# Patient Record
Sex: FEMALE | Race: WHITE | NOT HISPANIC OR LATINO | URBAN - METROPOLITAN AREA
[De-identification: names, ages, dates, MRNs, and addresses within clinical notes are randomized per-mention and may not be internally consistent; named-entity substitution may affect disease eponyms.]

---

## 2021-05-15 ENCOUNTER — OUT OF OFFICE VISIT (OUTPATIENT)
Dept: URBAN - METROPOLITAN AREA MEDICAL CENTER 24 | Facility: MEDICAL CENTER | Age: 36
End: 2021-05-15
Payer: MEDICARE

## 2021-05-15 DIAGNOSIS — R10.84 ABDOMINAL CRAMPING, GENERALIZED: ICD-10-CM

## 2021-05-15 DIAGNOSIS — K21.9 ACID REFLUX: ICD-10-CM

## 2021-05-15 DIAGNOSIS — K58.2 IRRITABLE BOWEL SYNDROME WITH BOTH CONSTIPATION AND DIARRHEA: ICD-10-CM

## 2021-05-15 DIAGNOSIS — R74.8 ABNORMAL ALKALINE PHOSPHATASE TEST: ICD-10-CM

## 2021-05-15 PROCEDURE — G8427 DOCREV CUR MEDS BY ELIG CLIN: HCPCS | Performed by: INTERNAL MEDICINE

## 2021-05-15 PROCEDURE — 99222 1ST HOSP IP/OBS MODERATE 55: CPT | Performed by: INTERNAL MEDICINE

## 2021-05-16 ENCOUNTER — OUT OF OFFICE VISIT (OUTPATIENT)
Dept: URBAN - METROPOLITAN AREA MEDICAL CENTER 24 | Facility: MEDICAL CENTER | Age: 36
End: 2021-05-16
Payer: MEDICARE

## 2021-05-16 DIAGNOSIS — K21.9 ACID REFLUX: ICD-10-CM

## 2021-05-16 DIAGNOSIS — K31.89 ACQUIRED DEFORMITY OF DUODENUM: ICD-10-CM

## 2021-05-16 PROCEDURE — 43239 EGD BIOPSY SINGLE/MULTIPLE: CPT | Performed by: INTERNAL MEDICINE

## 2021-05-17 ENCOUNTER — OFFICE VISIT (OUTPATIENT)
Dept: URBAN - METROPOLITAN AREA CLINIC 92 | Facility: CLINIC | Age: 36
End: 2021-05-17

## 2024-02-13 ENCOUNTER — LAB (OUTPATIENT)
Dept: URBAN - METROPOLITAN AREA CLINIC 115 | Facility: CLINIC | Age: 39
End: 2024-02-13

## 2024-02-13 ENCOUNTER — OV CON (OUTPATIENT)
Dept: URBAN - METROPOLITAN AREA CLINIC 115 | Facility: CLINIC | Age: 39
End: 2024-02-13
Payer: MEDICARE

## 2024-02-13 VITALS
TEMPERATURE: 97.7 F | BODY MASS INDEX: 35.03 KG/M2 | HEIGHT: 66 IN | SYSTOLIC BLOOD PRESSURE: 131 MMHG | DIASTOLIC BLOOD PRESSURE: 90 MMHG | HEART RATE: 103 BPM | WEIGHT: 218 LBS

## 2024-02-13 DIAGNOSIS — F41.9 ANXIETY DISORDER, UNSPECIFIED: ICD-10-CM

## 2024-02-13 DIAGNOSIS — K76.0 NAFLD (NONALCOHOLIC FATTY LIVER DISEASE): ICD-10-CM

## 2024-02-13 DIAGNOSIS — D12.6 COLON ADENOMAS: ICD-10-CM

## 2024-02-13 DIAGNOSIS — R10.12 LUQ PAIN: ICD-10-CM

## 2024-02-13 DIAGNOSIS — Z87.19 HISTORY OF IBS: ICD-10-CM

## 2024-02-13 DIAGNOSIS — R16.2 HEPATOSPLENOMEGALY: ICD-10-CM

## 2024-02-13 DIAGNOSIS — E66.9 OBESITY (BMI 30-39.9): ICD-10-CM

## 2024-02-13 DIAGNOSIS — R10.9 FUNCTIONAL ABDOMINAL PAIN SYNDROME: ICD-10-CM

## 2024-02-13 DIAGNOSIS — F32.A DEPRESSION, UNSPECIFIED: ICD-10-CM

## 2024-02-13 PROBLEM — 231504006: Status: ACTIVE | Noted: 2024-02-13

## 2024-02-13 PROBLEM — 162864005: Status: ACTIVE | Noted: 2024-02-13

## 2024-02-13 PROBLEM — 1231824009: Status: ACTIVE | Noted: 2024-02-13

## 2024-02-13 PROCEDURE — 99204 OFFICE O/P NEW MOD 45 MIN: CPT | Performed by: INTERNAL MEDICINE

## 2024-02-13 RX ORDER — OMEPRAZOLE MAGNESIUM 10 MG/1
1 PACKET MIXED WITH 15 ML OF WATER GRANULE, DELAYED RELEASE ORAL ONCE A DAY
Qty: 30 | Status: ACTIVE | COMMUNITY
Start: 2024-02-13

## 2024-02-13 RX ORDER — DICYCLOMINE HYDROCHLORIDE 20 MG/1
1 TABLET TABLET ORAL
Qty: 60 | Refills: 1 | OUTPATIENT
Start: 2024-02-13 | End: 2024-04-13

## 2024-02-13 RX ORDER — NADOLOL 40 MG/1
TABLET ORAL
Qty: 90 TABLET | Status: ACTIVE | COMMUNITY

## 2024-02-13 RX ORDER — IMMUNE GLOBULIN SUBCUTANEOUS (HUMAN) 200 MG/ML
INJECTION, SOLUTION SUBCUTANEOUS
Qty: 160 | Status: ACTIVE | COMMUNITY

## 2024-02-13 RX ORDER — GABAPENTIN 300 MG/1
CAPSULE ORAL
Qty: 180 CAPSULE | Status: ACTIVE | COMMUNITY

## 2024-02-13 NOTE — HPI-TODAY'S VISIT:
EGD '21 w gastritis, no hp, no celiac. Colonoscopy '16 w nml random bx. hx IBSd. Fibromyalgia, chr pain, disability, anxiety/DPN, sees psych, on seroquel, on neurontin recently increased. MRI NS '21 w nml GB, Hepatosplenomegaly. reports prior hx pancreatitis. Colonoscopy '19 [place], 2 small adenomas, nml TI, nml random bx, rec 5 y repeat. Int LUQ pains, can last hours, can be severe. hx nml GES.

## 2024-02-18 LAB
ACTIN (SMOOTH MUSCLE) ANTIBODY (IGG): <20
ALBUMIN/GLOBULIN RATIO: 1.6
ALBUMIN: 4.6
ALKALINE PHOSPHATASE: 80
ALT: 8
AST: 17
BILIRUBIN, TOTAL: 0.4
BUN/CREATININE RATIO: (no result)
CALCIUM: 9.7
CARBON DIOXIDE: 18
CERULOPLASMIN: 36
CHLORIDE: 107
CREATININE: 0.97
EGFR: 77
FERRITIN, SERUM: 37
FIB 4 INDEX: 0.74
FIB 4 INTERPRETATION: (no result)
GLOBULIN: 2.8
GLUCOSE: 89
HEMATOCRIT: 45.4
HEMOGLOBIN: 15.3
HEPATITIS B CORE AB TOTAL: (no result)
HEPATITIS B SURFACE AB IMMUNITY, QN: 308
HEPATITIS B SURFACE ANTIGEN: (no result)
HEPATITIS C ANTIBODY: (no result)
IMMUNOGLOBULIN A, QN, SERUM: 115
INTERPRETATION: (no result)
IRON BIND.CAP.(TIBC): 360
IRON SATURATION: 20
IRON: 72
LIPASE: 77
MCH: 28.3
MCHC: 33.7
MCV: 84.1
MITOCHONDRIAL (M2) ANTIBODY: <=20
MPV: 11.9
PLATELET COUNT: 310
PLATELET COUNT: 310
POTASSIUM: 4.3
PROTEIN, TOTAL: 7.4
RDW: 12.7
RED BLOOD CELL COUNT: 5.4
SODIUM: 140
T-TRANSGLUTAMINASE (TTG) IGA: <1
UREA NITROGEN (BUN): 17
WHITE BLOOD CELL COUNT: 7.8

## 2024-03-15 ENCOUNTER — COL/EGD (OUTPATIENT)
Dept: URBAN - METROPOLITAN AREA SURGERY CENTER 13 | Facility: SURGERY CENTER | Age: 39
End: 2024-03-15
Payer: MEDICARE

## 2024-03-15 ENCOUNTER — LAB (OUTPATIENT)
Dept: URBAN - METROPOLITAN AREA CLINIC 4 | Facility: CLINIC | Age: 39
End: 2024-03-15
Payer: MEDICARE

## 2024-03-15 ENCOUNTER — LAB (OUTPATIENT)
Dept: URBAN - METROPOLITAN AREA SURGERY CENTER 13 | Facility: SURGERY CENTER | Age: 39
End: 2024-03-15

## 2024-03-15 DIAGNOSIS — K31.89 OTHER DISEASES OF STOMACH AND DUODENUM: ICD-10-CM

## 2024-03-15 DIAGNOSIS — T47.8X5A ADVERSE EFFECT OF OTHER AGENTS PRIMARILY AFFECTING GASTROINTESTINAL SYSTEM, INITIAL ENCOUNTER: ICD-10-CM

## 2024-03-15 DIAGNOSIS — K31.89 ACHYLIA: ICD-10-CM

## 2024-03-15 DIAGNOSIS — K64.1 BLEEDING GRADE II HEMORRHOIDS: ICD-10-CM

## 2024-03-15 DIAGNOSIS — R19.7 ACUTE DIARRHEA: ICD-10-CM

## 2024-03-15 DIAGNOSIS — K63.89 OTHER SPECIFIED DISEASES OF INTESTINE: ICD-10-CM

## 2024-03-15 DIAGNOSIS — K63.89 BACTERIAL OVERGROWTH SYNDROME: ICD-10-CM

## 2024-03-15 DIAGNOSIS — R10.33 ABDOMINAL PAIN, ACUTE, PERIUMBILICAL: ICD-10-CM

## 2024-03-15 PROCEDURE — 88313 SPECIAL STAINS GROUP 2: CPT | Performed by: PATHOLOGY

## 2024-03-15 PROCEDURE — 88342 IMHCHEM/IMCYTCHM 1ST ANTB: CPT | Performed by: PATHOLOGY

## 2024-03-15 PROCEDURE — 88312 SPECIAL STAINS GROUP 1: CPT | Performed by: PATHOLOGY

## 2024-03-15 PROCEDURE — 46221 LIGATION OF HEMORRHOID(S): CPT | Performed by: INTERNAL MEDICINE

## 2024-03-15 PROCEDURE — 43239 EGD BIOPSY SINGLE/MULTIPLE: CPT | Performed by: INTERNAL MEDICINE

## 2024-03-15 PROCEDURE — 88305 TISSUE EXAM BY PATHOLOGIST: CPT | Performed by: PATHOLOGY

## 2024-03-15 PROCEDURE — 45380 COLONOSCOPY AND BIOPSY: CPT | Performed by: INTERNAL MEDICINE

## 2024-03-15 RX ORDER — DICYCLOMINE HYDROCHLORIDE 20 MG/1
1 TABLET TABLET ORAL
Qty: 60 | Refills: 1 | Status: ACTIVE | COMMUNITY
Start: 2024-02-13 | End: 2024-04-13

## 2024-03-15 RX ORDER — IMMUNE GLOBULIN SUBCUTANEOUS (HUMAN) 200 MG/ML
INJECTION, SOLUTION SUBCUTANEOUS
Qty: 160 | Status: ACTIVE | COMMUNITY

## 2024-03-15 RX ORDER — OMEPRAZOLE MAGNESIUM 10 MG/1
1 PACKET MIXED WITH 15 ML OF WATER GRANULE, DELAYED RELEASE ORAL ONCE A DAY
Qty: 30 | Status: ACTIVE | COMMUNITY
Start: 2024-02-13

## 2024-03-15 RX ORDER — GABAPENTIN 300 MG/1
CAPSULE ORAL
Qty: 180 CAPSULE | Status: ACTIVE | COMMUNITY

## 2024-03-15 RX ORDER — NADOLOL 40 MG/1
TABLET ORAL
Qty: 90 TABLET | Status: ACTIVE | COMMUNITY

## 2024-04-15 ENCOUNTER — LAB (OUTPATIENT)
Dept: URBAN - METROPOLITAN AREA CLINIC 115 | Facility: CLINIC | Age: 39
End: 2024-04-15

## 2024-05-01 ENCOUNTER — LAB OUTSIDE AN ENCOUNTER (OUTPATIENT)
Dept: URBAN - METROPOLITAN AREA CLINIC 115 | Facility: CLINIC | Age: 39
End: 2024-05-01

## 2024-05-01 ENCOUNTER — TELEPHONE ENCOUNTER (OUTPATIENT)
Dept: URBAN - METROPOLITAN AREA CLINIC 115 | Facility: CLINIC | Age: 39
End: 2024-05-01

## 2024-05-02 ENCOUNTER — LAB OUTSIDE AN ENCOUNTER (OUTPATIENT)
Dept: URBAN - METROPOLITAN AREA CLINIC 115 | Facility: CLINIC | Age: 39
End: 2024-05-02

## 2024-05-02 LAB
CREATININE POC: 1.1
PERFORMING LAB: (no result)

## 2024-05-03 ENCOUNTER — TELEPHONE ENCOUNTER (OUTPATIENT)
Dept: URBAN - METROPOLITAN AREA CLINIC 115 | Facility: CLINIC | Age: 39
End: 2024-05-03